# Patient Record
Sex: MALE | Race: WHITE | Employment: OTHER | URBAN - METROPOLITAN AREA
[De-identification: names, ages, dates, MRNs, and addresses within clinical notes are randomized per-mention and may not be internally consistent; named-entity substitution may affect disease eponyms.]

---

## 2017-05-09 ENCOUNTER — HOSPITAL ENCOUNTER (OUTPATIENT)
Age: 77
Setting detail: OUTPATIENT SURGERY
Discharge: HOME | End: 2017-05-09
Attending: INTERNAL MEDICINE | Admitting: INTERNAL MEDICINE
Payer: MEDICARE

## 2017-05-09 ENCOUNTER — ANESTHESIA (OUTPATIENT)
Dept: ENDOSCOPY | Age: 77
End: 2017-05-09
Payer: MEDICARE

## 2017-05-09 ENCOUNTER — ANESTHESIA EVENT (OUTPATIENT)
Dept: ENDOSCOPY | Age: 77
End: 2017-05-09
Payer: MEDICARE

## 2017-05-09 VITALS
TEMPERATURE: 98.2 F | RESPIRATION RATE: 16 BRPM | HEART RATE: 73 BPM | SYSTOLIC BLOOD PRESSURE: 136 MMHG | OXYGEN SATURATION: 100 % | DIASTOLIC BLOOD PRESSURE: 74 MMHG

## 2017-05-09 LAB
H PYLORI FROM TISSUE: NEGATIVE
KIT LOT NO., HCLOLOT: NORMAL
NEGATIVE CONTROL: NEGATIVE
POSITIVE CONTROL: POSITIVE

## 2017-05-09 PROCEDURE — 76060000031 HC ANESTHESIA FIRST 0.5 HR: Performed by: INTERNAL MEDICINE

## 2017-05-09 PROCEDURE — 74011000250 HC RX REV CODE- 250

## 2017-05-09 PROCEDURE — 88312 SPECIAL STAINS GROUP 1: CPT | Performed by: INTERNAL MEDICINE

## 2017-05-09 PROCEDURE — 88305 TISSUE EXAM BY PATHOLOGIST: CPT | Performed by: INTERNAL MEDICINE

## 2017-05-09 PROCEDURE — 88342 IMHCHEM/IMCYTCHM 1ST ANTB: CPT | Performed by: INTERNAL MEDICINE

## 2017-05-09 PROCEDURE — 74011250636 HC RX REV CODE- 250/636: Performed by: INTERNAL MEDICINE

## 2017-05-09 PROCEDURE — 87077 CULTURE AEROBIC IDENTIFY: CPT | Performed by: INTERNAL MEDICINE

## 2017-05-09 PROCEDURE — 76040000019: Performed by: INTERNAL MEDICINE

## 2017-05-09 PROCEDURE — 74011250636 HC RX REV CODE- 250/636

## 2017-05-09 PROCEDURE — 77030018712 HC DEV BLLN INFL BSC -B: Performed by: INTERNAL MEDICINE

## 2017-05-09 PROCEDURE — 77030019988 HC FCPS ENDOSC DISP BSC -B: Performed by: INTERNAL MEDICINE

## 2017-05-09 PROCEDURE — C1726 CATH, BAL DIL, NON-VASCULAR: HCPCS | Performed by: INTERNAL MEDICINE

## 2017-05-09 RX ORDER — DEXTROMETHORPHAN/PSEUDOEPHED 2.5-7.5/.8
1.2 DROPS ORAL
Status: DISCONTINUED | OUTPATIENT
Start: 2017-05-09 | End: 2017-05-09 | Stop reason: HOSPADM

## 2017-05-09 RX ORDER — PANTOPRAZOLE SODIUM 40 MG/1
40 TABLET, DELAYED RELEASE ORAL DAILY
Qty: 30 TAB | Refills: 5 | Status: SHIPPED | OUTPATIENT
Start: 2017-05-09

## 2017-05-09 RX ORDER — ATROPINE SULFATE 0.1 MG/ML
0.5 INJECTION INTRAVENOUS
Status: DISCONTINUED | OUTPATIENT
Start: 2017-05-09 | End: 2017-05-09 | Stop reason: HOSPADM

## 2017-05-09 RX ORDER — EPINEPHRINE 0.1 MG/ML
1 INJECTION INTRACARDIAC; INTRAVENOUS
Status: DISCONTINUED | OUTPATIENT
Start: 2017-05-09 | End: 2017-05-09 | Stop reason: HOSPADM

## 2017-05-09 RX ORDER — SODIUM CHLORIDE 9 MG/ML
100 INJECTION, SOLUTION INTRAVENOUS CONTINUOUS
Status: DISCONTINUED | OUTPATIENT
Start: 2017-05-09 | End: 2017-05-09 | Stop reason: HOSPADM

## 2017-05-09 RX ORDER — PROPOFOL 10 MG/ML
INJECTION, EMULSION INTRAVENOUS AS NEEDED
Status: DISCONTINUED | OUTPATIENT
Start: 2017-05-09 | End: 2017-05-09 | Stop reason: HOSPADM

## 2017-05-09 RX ORDER — SODIUM CHLORIDE 0.9 % (FLUSH) 0.9 %
5-10 SYRINGE (ML) INJECTION EVERY 8 HOURS
Status: DISCONTINUED | OUTPATIENT
Start: 2017-05-09 | End: 2017-05-09 | Stop reason: HOSPADM

## 2017-05-09 RX ORDER — FLUMAZENIL 0.1 MG/ML
0.2 INJECTION INTRAVENOUS
Status: DISCONTINUED | OUTPATIENT
Start: 2017-05-09 | End: 2017-05-09 | Stop reason: HOSPADM

## 2017-05-09 RX ORDER — LIDOCAINE HYDROCHLORIDE 20 MG/ML
INJECTION, SOLUTION EPIDURAL; INFILTRATION; INTRACAUDAL; PERINEURAL AS NEEDED
Status: DISCONTINUED | OUTPATIENT
Start: 2017-05-09 | End: 2017-05-09 | Stop reason: HOSPADM

## 2017-05-09 RX ORDER — NALOXONE HYDROCHLORIDE 0.4 MG/ML
0.4 INJECTION, SOLUTION INTRAMUSCULAR; INTRAVENOUS; SUBCUTANEOUS
Status: DISCONTINUED | OUTPATIENT
Start: 2017-05-09 | End: 2017-05-09 | Stop reason: HOSPADM

## 2017-05-09 RX ORDER — MIDAZOLAM HYDROCHLORIDE 1 MG/ML
1-3 INJECTION, SOLUTION INTRAMUSCULAR; INTRAVENOUS
Status: DISCONTINUED | OUTPATIENT
Start: 2017-05-09 | End: 2017-05-09 | Stop reason: HOSPADM

## 2017-05-09 RX ORDER — SODIUM CHLORIDE 0.9 % (FLUSH) 0.9 %
5-10 SYRINGE (ML) INJECTION AS NEEDED
Status: DISCONTINUED | OUTPATIENT
Start: 2017-05-09 | End: 2017-05-09 | Stop reason: HOSPADM

## 2017-05-09 RX ADMIN — LIDOCAINE HYDROCHLORIDE 100 MG: 20 INJECTION, SOLUTION EPIDURAL; INFILTRATION; INTRACAUDAL; PERINEURAL at 14:25

## 2017-05-09 RX ADMIN — PROPOFOL 180 MG: 10 INJECTION, EMULSION INTRAVENOUS at 14:40

## 2017-05-09 RX ADMIN — SODIUM CHLORIDE 100 ML/HR: 900 INJECTION, SOLUTION INTRAVENOUS at 14:20

## 2017-05-09 NOTE — DISCHARGE INSTRUCTIONS
Jud Eid MD  [facility], [address] [place] [address]  [phone]  [email or web address]    [place]  [id number]  1940    EGD DISCHARGE INSTRUCTIONS    Discomfort:  Sore throat- throat lozenges or warm salt water gargle  redness at IV site- apply warm compress to area; if redness or soreness persist- contact your physician  Gaseous discomfort- walking, belching will help relieve any discomfort  You may not operate a vehicle for 12 hours  You may not engage in an occupation involving machinery or appliances for rest of today  You may not drink alcoholic beverages for at least 12 hours  Avoid making any critical decisions for at least 24 hour  DIET  You may have anything by mouth. You may eat and drink immediately. You may resume your regular diet - however -  remember your colon is empty and a heavy meal will produce gas. Avoid these foods:  vegetables, fried / greasy foods, carbonated drinks      ACTIVITY  You may resume your normal daily activities   Spend the remainder of the day resting -  avoid any strenuous activity. CALL M.D. ANY SIGN OF   Increasing pain, nausea, vomiting  Abdominal distension (swelling)  New increased bleeding (oral or rectal)  Fever (chills)  Pain in chest area  Bloody discharge from nose or mouth  Shortness of breath    Follow-up Instructions:   Call Dr. Jud Eid for any questions or problems. Telephone # [phone]  Dr. Luz Maria Brantley office will notify you of the biopsy results available  Within 7 to 10 days. We will call you or send a letter      ENDOSCOPY FINDINGS:   Your endoscopy showed erosive esophagitis with a stricture that was dilated and erosive gastritis.       DISCHARGE SUMMARY from Nurse    The following personal items collected during your admission are returned to you:   Dental Appliance: Dental Appliances: Partials, Uppers  Vision: Visual Aid: None  Hearing Aid: Jewelry:    Clothing:    Other Valuables:    Valuables sent to safe: MyChart Activation    Thank you for requesting access to Hemarina. Please follow the instructions below to securely access and download your online medical record. Hemarina allows you to send messages to your doctor, view your test results, renew your prescriptions, schedule appointments, and more. How Do I Sign Up? 1. In your internet browser, go to [email or web address]  2. Click on the First Time User? Click Here link in the Sign In box. You will be redirect to the New Member Sign Up page. 3. Enter your Hemarina Access Code exactly as it appears below. You will not need to use this code after youve completed the sign-up process. If you do not sign up before the expiration date, you must request a new code. Hemarina Access Code: [email or web address]  Expires: 2017 10:41 AM (This is the date your Hemarina access code will )    4. Enter the last four digits of your Social Security Number (xxxx) and Date of Birth (mm/dd/yyyy) as indicated and click Submit. You will be taken to the next sign-up page. 5. Create a Hemarina ID. This will be your Hemarina login ID and cannot be changed, so think of one that is secure and easy to remember. 6. Create a Hemarina password. You can change your password at any time. 7. Enter your Password Reset Question and Answer. This can be used at a later time if you forget your password. 8. Enter your e-mail address. You will receive e-mail notification when new information is available in [address] 9. Click Sign Up. You can now view and download portions of your medical record. 10. Click the Download Summary menu link to download a portable copy of your medical information. Additional Information    If you have questions, please visit the Frequently Asked Questions section of the Hemarina website at [email or web address] Remember, Hemarina is NOT to be used for urgent needs.  For medical emergencies, dial 911.

## 2017-05-09 NOTE — ANESTHESIA POSTPROCEDURE EVALUATION
Post-Anesthesia Evaluation and Assessment    Patient: Reno Kaufman MRN: [id number]  SSN: [id number]    YOB: 1940  Age: 65 y.o. Sex: male       Cardiovascular Function/Vital Signs  Visit Vitals    /72    Pulse 64    Temp 36.8 °C (98.2 °F)    Resp 13    SpO2 100%       Patient is status post total IV anesthesia anesthesia for Procedure(s):  ESOPHAGOGASTRODUODENOSCOPY (EGD)  ESOPHAGEAL DILATION  ESOPHAGOGASTRODUODENAL (EGD) BIOPSY. Nausea/Vomiting: None    Postoperative hydration reviewed and adequate. Pain:  Pain Scale 1: Numeric (0 - 10) (05/09/17 1452)  Pain Intensity 1: 0 (05/09/17 1452)   Managed    Neurological Status: At baseline    Mental Status and Level of Consciousness: Arousable    Pulmonary Status:   O2 Device: Room air (05/09/17 1452)   Adequate oxygenation and airway patent    Complications related to anesthesia: None    Post-anesthesia assessment completed.  No concerns    Signed By: Guanakito Lei MD     May 9, 2017

## 2017-05-09 NOTE — IP AVS SNAPSHOT
[address] [facility] 
[phone] Patient: Luz Ramírez MRN: [id number] DJW:[birth year] You are allergic to the following No active allergies Recent Documentation Smoking Status Former Smoker Emergency Contacts Name Discharge Info Relation Home Work Mobile Tracee Curtis  Spouse [3] [phone] Gilmer Curtis  Son [22] [phone] About your hospitalization You were admitted on:  May 9, 2017 You last received care in the:  [facility] ENDOSCOPY You were discharged on:  May 9, 2017 Unit phone number:  [phone] Why you were hospitalized Your primary diagnosis was:  Not on File Providers Seen During Your Hospitalizations Provider Role Specialty Primary office phone Yazmin Lewis MD Attending Provider Gastroenterology [phone] Your Primary Care Physician (PCP) Primary Care Physician Office Phone Office Fax Catherine Ham [phone] Follow-up Information Follow up With Details Comments Contact Info Yashira Dawson MD   [place] [address] 
[phone] Current Discharge Medication List  
  
START taking these medications Dose & Instructions Dispensing Information Comments Morning Noon Evening Bedtime  
 pantoprazole 40 mg tablet Commonly known as:  PROTONIX Your last dose was: Your next dose is:    
   
   
 Dose:  40 mg Take 1 Tab by mouth daily. Indications: EROSIVE ESOPHAGITIS Quantity:  30 Tab Refills:  5 CONTINUE these medications which have NOT CHANGED Dose & Instructions Dispensing Information Comments Morning Noon Evening Bedtime  
 cilostazol 50 mg tablet Commonly known as:  PLETAL Your last dose was: Your next dose is:    
   
   
 Dose:  50 mg Take 1 Tab by mouth Before breakfast and dinner. Quantity:  60 Tab Refills:  11  
     
   
   
   
  
 citalopram 20 mg tablet Commonly known as:  Wilner Duarte Your last dose was: Your next dose is: TAKE 1 TABLET ORALLY DAILY Quantity:  30 Tab Refills:  8  
     
   
   
   
  
 donepezil 10 mg tablet Commonly known as:  ARICEPT Your last dose was: Your next dose is: TAKE 1 TABLET BY MOUTH EVERY DAY Quantity:  30 Tab Refills:  8  
     
   
   
   
  
 memantine 10 mg tablet Commonly known as:  Luisana Divine Your last dose was: Your next dose is:    
   
   
 Dose:  10 mg Take 1 Tab by mouth two (2) times a day. Quantity:  180 Tab Refills:  3  
     
   
   
   
  
 risperiDONE 0.5 mg tablet Commonly known as:  RisperDAL Your last dose was: Your next dose is:    
   
   
 Dose:  0.5 mg Take 1 Tab by mouth two (2) times a day. Quantity:  60 Tab Refills:  11 Where to Get Your Medications Information on where to get these meds will be given to you by the nurse or doctor. ! Ask your nurse or doctor about these medications  
  pantoprazole 40 mg tablet Discharge Instructions Weston Solano MD 
[facility], [address] [place] [address] [address] [place] [address] 
[phone] 
[email or web address] [place] [id number] 
1940 EGD DISCHARGE INSTRUCTIONS Discomfort: 
Sore throat- throat lozenges or warm salt water gargle 
redness at IV site- apply warm compress to area; if redness or soreness persist- contact your physician Gaseous discomfort- walking, belching will help relieve any discomfort You may not operate a vehicle for 12 hours You may not engage in an occupation involving machinery or appliances for rest of today You may not drink alcoholic beverages for at least 12 hours Avoid making any critical decisions for at least 24 hour DIET You may have anything by mouth. You may eat and drink immediately. You may resume your regular diet  however -  remember your colon is empty and a heavy meal will produce gas. Avoid these foods:  vegetables, fried / greasy foods, carbonated drinks ACTIVITY You may resume your normal daily activities Spend the remainder of the day resting -  avoid any strenuous activity. CALL M.D. ANY SIGN OF Increasing pain, nausea, vomiting Abdominal distension (swelling) New increased bleeding (oral or rectal) Fever (chills) Pain in chest area Bloody discharge from nose or mouth Shortness of breath Follow-up Instructions: 
 Call Dr. Dajuan Mendoza for any questions or problems. Telephone # [phone] Dr. Arthur Risk office will notify you of the biopsy results available  Within 7 to 10 days. We will call you or send a letter ENDOSCOPY FINDINGS: 
 Your endoscopy showed erosive esophagitis with a stricture that was dilated and erosive gastritis. DISCHARGE SUMMARY from Nurse The following personal items collected during your admission are returned to you:  
Dental Appliance: Dental Appliances: Partials, Uppers Vision: Visual Aid: None Hearing Aid:   
Jewelry:   
Clothing:   
Other Valuables:   
Valuables sent to safe: MobileHelphart Activation Thank you for requesting access to Stupil. Please follow the instructions below to securely access and download your online medical record. Stupil allows you to send messages to your doctor, view your test results, renew your prescriptions, schedule appointments, and more. How Do I Sign Up? 1. In your internet browser, go to [email or web address] 
2. Click on the First Time User? Click Here link in the Sign In box. You will be redirect to the New Member Sign Up page. 3. Enter your Handprint Access Code exactly as it appears below. You will not need to use this code after youve completed the sign-up process. If you do not sign up before the expiration date, you must request a new code. Handprint Access Code: [email or web address] Expires: 2017 10:41 AM (This is the date your Handprint access code will ) 4. Enter the last four digits of your Social Security Number (xxxx) and Date of Birth (mm/dd/yyyy) as indicated and click Submit. You will be taken to the next sign-up page. 5. Create a Handprint ID. This will be your Handprint login ID and cannot be changed, so think of one that is secure and easy to remember. 6. Create a Handprint password. You can change your password at any time. 7. Enter your Password Reset Question and Answer. This can be used at a later time if you forget your password. 8. Enter your e-mail address. You will receive e-mail notification when new information is available in [address] 9. Click Sign Up. You can now view and download portions of your medical record. 10. Click the Download Summary menu link to download a portable copy of your medical information. Additional Information If you have questions, please visit the Frequently Asked Questions section of the Handprint website at [email or web address] Remember, Handprint is NOT to be used for urgent needs. For medical emergencies, dial 911. Discharge Orders None Introducing [facility]! [employer] introduces Handprint patient portal. Now you can access parts of your medical record, email your doctor's office, and request medication refills online. 1. In your internet browser, go to [email or web address] 2. Click on the First Time User? Click Here link in the Sign In box. You will see the New Member Sign Up page. 3. Enter your Handprint Access Code exactly as it appears below.  You will not need to use this code after youve completed the sign-up process. If you do not sign up before the expiration date, you must request a new code. · Recommind Access Code: [email or web address] Expires: 8/7/2017 10:41 AM 
 
4. Enter the last four digits of your Social Security Number (xxxx) and Date of Birth (mm/dd/yyyy) as indicated and click Submit. You will be taken to the next sign-up page. 5. Create a Recommind ID. This will be your Recommind login ID and cannot be changed, so think of one that is secure and easy to remember. 6. Create a Recommind password. You can change your password at any time. 7. Enter your Password Reset Question and Answer. This can be used at a later time if you forget your password. 8. Enter your e-mail address. You will receive e-mail notification when new information is available in [address] 9. Click Sign Up. You can now view and download portions of your medical record. 10. Click the Download Summary menu link to download a portable copy of your medical information. If you have questions, please visit the Frequently Asked Questions section of the Recommind website. Remember, Recommind is NOT to be used for urgent needs. For medical emergencies, dial 911. Now available from your iPhone and Android! General Information Please provide this summary of care documentation to your next provider. Patient Signature:  ____________________________________________________________ Date:  ____________________________________________________________  
  
Abelardo Greyson Provider Signature:  ____________________________________________________________ Date:  ____________________________________________________________

## 2017-05-09 NOTE — ROUTINE PROCESS
TRANSFER - IN REPORT:    Verbal report received from [facility]) on [address] [facility]  being received from endo(unit) for ordered procedure      Report consisted of patients Situation, Background, Assessment and   Recommendations(SBAR). Information from the following report(s) SBAR was reviewed with the receiving nurse. Opportunity for questions and clarification was provided. Assessment completed upon patients arrival to unit and care assumed.

## 2017-05-09 NOTE — PROGRESS NOTES
Anesthesia reports 180mg Propofol, 100mg Lidocaine and 550mL NS given during procedure. Received report from anesthesia staff on vital signs and status of patient.

## 2017-05-09 NOTE — H&P
Jossy Rodriguez MD  [facility], [address], [address] [facility]  [phone]  [email or web address]      Gastroenterology Outpatient History and Physical    Patient: [place]    Physician: Truman Stinson MD    Vital Signs: Blood pressure 149/76, pulse 63, temperature 98.2 °F (36.8 °C), resp. rate 14, SpO2 97 %. Allergies: No Known Allergies    Chief Complaint: difficulty swallowing    History of Present Illness: Here for EGD and dilation due to increasing solid food dysphagia. Last EGD done 4/2015 when had an esophageal food impaction. Stricture dilated at that time. History:  Past Medical History:   Diagnosis Date    Dementia     Depression 2/15/2013    Neurological disorder     bilateral feet    Snores     2/2014 Per wife, to schedule sleep study    Unspecified sleep apnea     no cpap      Past Surgical History:   Procedure Laterality Date    ENDOSCOPY, COLON, DIAGNOSTIC  2010    Neg. Rep 10 yrs.     HX HEENT  CHILDHOOD    RIGHT EYE SURGERY OPENED LID, BORN WITH LID DROOP    HX HEENT      upper bleth    HX OTHER SURGICAL      right 2nd toe amputation    HX OTHER SURGICAL      surgery on left lateral ulcer foot    [place] BALLOON DILATION ESOPHAGUS <30 MM DIAM  3/26/2014         [place] EGD FLEXIBLE FOREIGN BODY REMOVAL  3/31/2015         [place] E[place] TRANSORAL BIOPSY SINGLE/MULTIPLE  3/26/2014         UPPER GI ENDOSCOPY,BALL DIL,30MM  3/31/2015         UPPER GI ENDOSCOPY,BIOPSY  3/31/2015           Social History     Social History    Marital status:      Spouse name: N/A    Number of children: N/A    Years of education: N/A     Social History Main Topics    Smoking status: Former Smoker     Packs/day: 1.00     Years: 12.00     Quit date: 1/1/1984    Smokeless tobacco: Never Used    Alcohol use 2.4 oz/week     4 Cans of beer per week    Drug use: No    Sexual activity: Not Asked     Other Topics Concern    None     Social History Narrative    ** Merged History Encounter **           Family History   Problem Relation Age of Onset    Psychiatric Disorder Father      suicie    Other Mother       Patient Active Problem List   Diagnosis Code    Dysesthesia R20.8    Peripheral neuropathy ([address] G62.9    Pressure ulcer L89.90    Fracture of great toe S92.403A    Elevated BP [id number]    Dementia F03.90    Shortness of breath R06.02    Depression F32.9    Toe osteomyelitis, right (HCC) M86.9    Cellulitis and abscess of foot, except toes L03.119, L02.619    Mild cognitive impairment G31.84    Essential hypertension I10         Medications:   Prior to Admission medications    Medication Sig Start Date End Date Taking? Authorizing Provider   risperiDONE (RISPERDAL) 0.5 mg tablet Take 1 Tab by mouth two (2) times a day. 5/9/17  Yes Hardeep Rodney MD   cilostazol (PLETAL) 50 mg tablet Take 1 Tab by mouth Before breakfast and dinner. 5/9/17  Yes Hardeep Rodney MD   donepezil (ARICEPT) 10 mg tablet TAKE 1 TABLET BY MOUTH EVERY DAY 4/27/17  Yes MORA Rodney MD   citalopram (CELEXA) 20 mg tablet TAKE 1 TABLET ORALLY DAILY 4/27/17  Yes MORA Rodney MD   memantine [facility]) 10 mg tablet Take 1 Tab by mouth two (2) times a day.  2/8/17  Yes Hardeep Rodney MD       Physical Exam:     General: well developed, well nourished   HEENT: unremarkable   Heart: regular rhythm no mumur    Lungs: clear   Abdominal:  benign   Neurological: unremarkable   Extremities: no edema     Findings/Diagnosis: esophageal dysphagia    Plan of Care/Planned Procedure: EGD and dilation with  monitored anesthesia care sedation       Signed:  Sin Ludwig MD 5/9/2017

## 2017-05-09 NOTE — PROCEDURES
[facility]                   Endoscopic Gastroduodenoscopy Procedure Note      5/9/2017  Darian Curtis  1940  [id number]    Procedure: Endoscopic Gastroduodenoscopy with biopsy, esophageal dilation    Indication: esophageal dysphagia     Pre-operative Diagnosis: see indication above    Post-operative Diagnosis: see findings below    : P. Delano Holter. MD    Referring Provider:  Harris Monson MD      Anesthesia/Sedation:  MAC anesthesia Propofol    Airway assessment: No airway problems anticipated    Pre-Procedural Exam:      Airway: clear, no airway problems anticipated  Heart: RRR, without gallops or rubs  Lungs: clear bilaterally without wheezes, crackles, or rhonchi  Abdomen: soft, nontender, nondistended, bowel sounds present  Mental Status: awake, alert and oriented to person, place and time       Procedure Details     After infomed consent was obtained for the procedure, with all risks and benefits of procedure explained the patient was taken to the endoscopy suite and placed in the left lateral decubitus position. Following sequential administration of sedation as per above, the endoscope was inserted into the mouth and advanced under direct vision to second portion of the duodenum. A careful inspection was made as the gastroscope was withdrawn, including a retroflexed view of the proximal stomach; findings and interventions are described below. Findings:   Esophagus:  Erosive esophagitis at the GE junction with stricture, estimated diameter 9 mm. Scope does not pass until dilated. Dilated with balloon from 15 to 16.5 then 18mm. Stomach: Erosive gastritis of cardia, biopsied. Erosive gastritis localized in pre-pylori antrum, biopsied  Duodenum: normal    Therapies:  1. Biopsies of pre-pylori antrum  2. Biopsies of gastric cardia  3. Biopsies of ge junction    Specimens: 1. Biopsies of pre-pylori antrum  2.  Biopsies of gastric cardia  3. Biopsies of ge junction           Complications:   None; patient tolerated the procedure well. EBL:  None. Impression:      -Erosive esophagitis with stricture  -Erosive gastritis    Recommendations:  -Start acid suppression with pantoprazole 40 mg daily. , -Await pathology. , -Await pyloritek test result and treat for Helicobacter pylori if positive. , -Follow symptoms.     Max Enriquez., MD5/9/2017

## 2017-05-09 NOTE — ANESTHESIA PREPROCEDURE EVALUATION
Anesthetic History   No history of anesthetic complications            Review of Systems / Medical History  Patient summary reviewed, nursing notes reviewed and pertinent labs reviewed    Pulmonary        Sleep apnea: No treatment  Shortness of breath      Comments: Former Smoker - 12 pack years   Neuro/Psych         Psychiatric history and dementia    Comments: Depression   Dysesthesia  Peripheral neuropathy  Mild cognitive impairment Cardiovascular    Hypertension: well controlled              Exercise tolerance: >4 METS     GI/Hepatic/Renal               Comments: dysphagia Endo/Other        Obesity     Other Findings   Comments: Hx osteomyelitis  Dysphagia          Physical Exam    Airway  Mallampati: II    Neck ROM: normal range of motion   Mouth opening: Normal     Cardiovascular  Regular rate and rhythm,  S1 and S2 normal,  no murmur, click, rub, or gallop             Dental    Dentition: Upper partial plate     Pulmonary  Breath sounds clear to auscultation               Abdominal  GI exam deferred       Other Findings            Anesthetic Plan    ASA: 3  Anesthesia type: general and total IV anesthesia          Induction: Intravenous  Anesthetic plan and risks discussed with: Patient

## 2017-05-09 NOTE — PROGRESS NOTES
CRE balloon dilatation of the esophagus   15 mm Balloon inflated to 3 ATMs and held for 30 seconds. 16.5 mm Balloon inflated to 4.5 ATMs and held for 30 seconds. 18 mm Balloon inflated to 7 ATMs and held for 60 seconds. No subcutaneous crepitus of the chest or cervical region was noted post dilatation.

## 2017-11-27 ENCOUNTER — HOSPITAL ENCOUNTER (OUTPATIENT)
Dept: GENERAL RADIOLOGY | Age: 77
Discharge: HOME | End: 2017-11-27
Attending: INTERNAL MEDICINE
Payer: MEDICARE

## 2017-11-27 DIAGNOSIS — R63.4 WEIGHT LOSS: ICD-10-CM

## 2017-11-27 PROCEDURE — 71020 XR CHEST PA LAT: CPT

## 2017-12-04 ENCOUNTER — APPOINTMENT (OUTPATIENT)
Dept: CT IMAGING | Age: 77
End: 2017-12-04
Attending: EMERGENCY MEDICINE
Payer: MEDICARE

## 2017-12-04 ENCOUNTER — HOSPITAL ENCOUNTER (EMERGENCY)
Age: 77
Discharge: NURSING, REHAB OR LONG-TERM CARE | End: 2017-12-05
Attending: EMERGENCY MEDICINE
Payer: MEDICARE

## 2017-12-04 ENCOUNTER — APPOINTMENT (OUTPATIENT)
Dept: GENERAL RADIOLOGY | Age: 77
End: 2017-12-04
Attending: EMERGENCY MEDICINE
Payer: MEDICARE
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
## 2017-12-04 DIAGNOSIS — S01.112A EYEBROW LACERATION, LEFT, INITIAL ENCOUNTER: ICD-10-CM

## 2017-12-04 DIAGNOSIS — S60.221A CONTUSION OF RIGHT HAND, INITIAL ENCOUNTER: ICD-10-CM

## 2017-12-04 PROCEDURE — 93005 ELECTROCARDIOGRAM TRACING: CPT

## 2017-12-04 PROCEDURE — 70450 CT HEAD/BRAIN W/O DYE: CPT

## 2017-12-04 PROCEDURE — 99285 EMERGENCY DEPT VISIT HI MDM: CPT

## 2017-12-04 PROCEDURE — 72125 CT NECK SPINE W/O DYE: CPT

## 2017-12-04 PROCEDURE — 71020 XR CHEST PA LAT: CPT

## 2017-12-04 PROCEDURE — 75810000293 HC SIMP/SUPERF WND  RPR

## 2017-12-04 PROCEDURE — 73130 X-RAY EXAM OF HAND: CPT

## 2017-12-04 RX ORDER — SODIUM CHLORIDE 0.9 % (FLUSH) 0.9 %
5-10 SYRINGE (ML) INJECTION EVERY 8 HOURS
Status: DISCONTINUED | OUTPATIENT
Start: 2017-12-04 | End: 2017-12-05 | Stop reason: HOSPADM

## 2017-12-04 RX ORDER — SODIUM CHLORIDE 0.9 % (FLUSH) 0.9 %
5-10 SYRINGE (ML) INJECTION AS NEEDED
Status: DISCONTINUED | OUTPATIENT
Start: 2017-12-04 | End: 2017-12-05 | Stop reason: HOSPADM

## 2017-12-05 VITALS
OXYGEN SATURATION: 94 % | RESPIRATION RATE: 16 BRPM | DIASTOLIC BLOOD PRESSURE: 75 MMHG | TEMPERATURE: 98 F | HEART RATE: 70 BPM | SYSTOLIC BLOOD PRESSURE: 163 MMHG | BODY MASS INDEX: 25.6 KG/M2 | WEIGHT: 194 LBS

## 2017-12-05 LAB
ALBUMIN SERPL-MCNC: 3.3 G/DL (ref 3.5–5)
ALBUMIN/GLOB SERPL: 1.1 {RATIO} (ref 1.1–2.2)
ALP SERPL-CCNC: 57 U/L (ref 45–117)
ALT SERPL-CCNC: 19 U/L (ref 12–78)
ANION GAP SERPL CALC-SCNC: 7 MMOL/L (ref 5–15)
APPEARANCE UR: ABNORMAL
AST SERPL-CCNC: 24 U/L (ref 15–37)
ATRIAL RATE: 69 BPM
BACTERIA URNS QL MICRO: NEGATIVE /HPF
BASOPHILS # BLD: 0 K/UL (ref 0–0.1)
BASOPHILS NFR BLD: 0 % (ref 0–1)
BILIRUB SERPL-MCNC: 0.5 MG/DL (ref 0.2–1)
BILIRUB UR QL CFM: NEGATIVE
BUN SERPL-MCNC: 14 MG/DL (ref 6–20)
BUN/CREAT SERPL: 14 (ref 12–20)
CALCIUM SERPL-MCNC: 8.4 MG/DL (ref 8.5–10.1)
CALCULATED P AXIS, ECG09: 63 DEGREES
CALCULATED R AXIS, ECG10: -43 DEGREES
CALCULATED T AXIS, ECG11: 44 DEGREES
CHLORIDE SERPL-SCNC: 107 MMOL/L (ref 97–108)
CK MB CFR SERPL CALC: 2.4 % (ref 0–2.5)
CK MB SERPL-MCNC: 2.9 NG/ML (ref 5–25)
CK SERPL-CCNC: 120 U/L (ref 39–308)
CO2 SERPL-SCNC: 26 MMOL/L (ref 21–32)
COLOR UR: ABNORMAL
CREAT SERPL-MCNC: 1.01 MG/DL (ref 0.7–1.3)
DIAGNOSIS, 93000: NORMAL
EOSINOPHIL # BLD: 0.1 K/UL (ref 0–0.4)
EOSINOPHIL NFR BLD: 1 % (ref 0–7)
EPITH CASTS URNS QL MICRO: ABNORMAL /LPF
ERYTHROCYTE [DISTWIDTH] IN BLOOD BY AUTOMATED COUNT: 13.2 % (ref 11.5–14.5)
GLOBULIN SER CALC-MCNC: 3.1 G/DL (ref 2–4)
GLUCOSE SERPL-MCNC: 87 MG/DL (ref 65–100)
GLUCOSE UR STRIP.AUTO-MCNC: NEGATIVE MG/DL
HCT VFR BLD AUTO: 38.5 % (ref 36.6–50.3)
HGB BLD-MCNC: 13.3 G/DL (ref 12.1–17)
HGB UR QL STRIP: ABNORMAL
HYALINE CASTS URNS QL MICRO: ABNORMAL /LPF (ref 0–5)
KETONES UR QL STRIP.AUTO: NEGATIVE MG/DL
LEUKOCYTE ESTERASE UR QL STRIP.AUTO: ABNORMAL
LYMPHOCYTES # BLD: 2.1 K/UL (ref 0.8–3.5)
LYMPHOCYTES NFR BLD: 30 % (ref 12–49)
MCH RBC QN AUTO: 29.8 PG (ref 26–34)
MCHC RBC AUTO-ENTMCNC: 34.5 G/DL (ref 30–36.5)
MCV RBC AUTO: 86.3 FL (ref 80–99)
MONOCYTES # BLD: 0.9 K/UL (ref 0–1)
MONOCYTES NFR BLD: 13 % (ref 5–13)
NEUTS SEG # BLD: 3.9 K/UL (ref 1.8–8)
NEUTS SEG NFR BLD: 56 % (ref 32–75)
NITRITE UR QL STRIP.AUTO: NEGATIVE
P-R INTERVAL, ECG05: 184 MS
PH UR STRIP: 6 [PH] (ref 5–8)
PLATELET # BLD AUTO: 192 K/UL (ref 150–400)
POTASSIUM SERPL-SCNC: 3.9 MMOL/L (ref 3.5–5.1)
PROT SERPL-MCNC: 6.4 G/DL (ref 6.4–8.2)
PROT UR STRIP-MCNC: ABNORMAL MG/DL
Q-T INTERVAL, ECG07: 416 MS
QRS DURATION, ECG06: 80 MS
QTC CALCULATION (BEZET), ECG08: 445 MS
RBC # BLD AUTO: 4.46 M/UL (ref 4.1–5.7)
RBC #/AREA URNS HPF: ABNORMAL /HPF (ref 0–5)
SODIUM SERPL-SCNC: 140 MMOL/L (ref 136–145)
SP GR UR REFRACTOMETRY: 1.02 (ref 1–1.03)
TROPONIN I SERPL-MCNC: <0.04 NG/ML
UA: UC IF INDICATED,UAUC: ABNORMAL
UROBILINOGEN UR QL STRIP.AUTO: 1 EU/DL (ref 0.2–1)
VENTRICULAR RATE, ECG03: 69 BPM
WBC # BLD AUTO: 7 K/UL (ref 4.1–11.1)
WBC URNS QL MICRO: ABNORMAL /HPF (ref 0–4)

## 2017-12-05 PROCEDURE — 82550 ASSAY OF CK (CPK): CPT | Performed by: EMERGENCY MEDICINE

## 2017-12-05 PROCEDURE — 85025 COMPLETE CBC W/AUTO DIFF WBC: CPT | Performed by: EMERGENCY MEDICINE

## 2017-12-05 PROCEDURE — 36415 COLL VENOUS BLD VENIPUNCTURE: CPT | Performed by: EMERGENCY MEDICINE

## 2017-12-05 PROCEDURE — 80053 COMPREHEN METABOLIC PANEL: CPT | Performed by: EMERGENCY MEDICINE

## 2017-12-05 PROCEDURE — 77030011943

## 2017-12-05 PROCEDURE — 77030018836 HC SOL IRR NACL ICUM -A

## 2017-12-05 PROCEDURE — 84484 ASSAY OF TROPONIN QUANT: CPT | Performed by: EMERGENCY MEDICINE

## 2017-12-05 PROCEDURE — 75810000293 HC SIMP/SUPERF WND  RPR

## 2017-12-05 PROCEDURE — 77030002916 HC SUT ETHLN J&J -A

## 2017-12-05 PROCEDURE — 81001 URINALYSIS AUTO W/SCOPE: CPT | Performed by: EMERGENCY MEDICINE

## 2017-12-05 RX ORDER — LIDOCAINE HYDROCHLORIDE 10 MG/ML
5 INJECTION, SOLUTION EPIDURAL; INFILTRATION; INTRACAUDAL; PERINEURAL ONCE
Status: DISCONTINUED | OUTPATIENT
Start: 2017-12-05 | End: 2017-12-05 | Stop reason: HOSPADM

## 2017-12-05 NOTE — DISCHARGE INSTRUCTIONS
Hand Bruises: Care Instructions  Your Care Instructions  Bruises, or contusions, can happen as a result of an impact or fall. Most people think of a bruise as a black-and-blue spot. This happens when small blood vessels get torn and leak blood under the skin. The bruise may turn purplish black, reddish blue, or yellowish green as it heals. But bones and muscles can also get bruised. This may damage the hand but not cause a bruise that you can see. Most bruises aren't serious and will go away on their own in 2 to 4 weeks. But sometimes a more serious hand injury might not heal on its own. Tell your doctor if you have new symptoms or your injury is not getting better over time. You may have tests to see if you have bone or nerve damage. These tests may include X-rays, a CT scan, or an MRI. If you damaged bones or muscles, you may need more treatment. The doctor has checked you carefully, but problems can develop later. If you notice any problems or new symptoms, get medical treatment right away. Follow-up care is a key part of your treatment and safety. Be sure to make and go to all appointments, and call your doctor if you are having problems. It's also a good idea to know your test results and keep a list of the medicines you take. How can you care for yourself at home? · Put ice or a cold pack on the hand for 10 to 20 minutes at a time. Put a thin cloth between the ice and your skin. · Prop up your hand on a pillow when you ice it or anytime you sit or lie down during the next 3 days. Try to keep your hand above the level of your heart. This will help reduce swelling. · Be safe with medicines. Read and follow all instructions on the label. ¨ If the doctor gave you a prescription medicine for pain, take it as prescribed. ¨ If you are not taking a prescription pain medicine, ask your doctor if you can take an over-the-counter medicine.   · Be sure to follow your doctor's advice about moving and exercising your injured hand. When should you call for help? Call your doctor now or seek immediate medical care if:  ? · Your pain gets worse. ? · You have new or worse swelling. ? · You have tingling, weakness, or numbness in the area near the bruise. ? · The area near the bruise is cold or pale. ? · You have symptoms of infection, such as:  ¨ Increased pain, swelling, warmth, or redness. ¨ Red streaks leading from the area. ¨ Pus draining from the area. ¨ A fever. ? Watch closely for changes in your health, and be sure to contact your doctor if:  ? · You do not get better as expected. Where can you learn more? Go to http://manuel-reynaldo.info/. Enter K962 in the search box to learn more about \"Hand Bruises: Care Instructions. \"  Current as of: March 20, 2017  Content Version: 11.4  © 9580-3112 [employer]. Care instructions adapted under license by [employer] (which disclaims liability or warranty for this information). If you have questions about a medical condition or this instruction, always ask your healthcare professional. [place] [address] any warranty or liability for your use of this information. Preventing Falls: Care Instructions  Your Care Instructions    Getting around your home safely can be a challenge if you have injuries or health problems that make it easy for you to fall. Loose rugs and furniture in walkways are among the dangers for many older people who have problems walking or who have poor eyesight. People who have conditions such as arthritis, osteoporosis, or dementia also have to be careful not to fall. You can make your home safer with a few simple measures. Follow-up care is a key part of your treatment and safety. Be sure to make and go to all appointments, and call your doctor if you are having problems. It's also a good idea to know your test results and keep a list of the medicines you take.   How can you care for yourself at home? Taking care of yourself  · You may get dizzy if you do not drink enough water. To prevent dehydration, drink plenty of fluids, enough so that your urine is light yellow or clear like water. Choose water and other caffeine-free clear liquids. If you have kidney, heart, or liver disease and have to limit fluids, talk with your doctor before you increase the amount of fluids you drink. · Exercise regularly to improve your strength, muscle tone, and balance. Walk if you can. Swimming may be a good choice if you cannot walk easily. · Have your vision and hearing checked each year or any time you notice a change. If you have trouble seeing and hearing, you might not be able to avoid objects and could lose your balance. · Know the side effects of the medicines you take. Ask your doctor or pharmacist whether the medicines you take can affect your balance. Sleeping pills or sedatives can affect your balance. · Limit the amount of alcohol you drink. Alcohol can impair your balance and other senses. · Ask your doctor whether calluses or corns on your feet need to be removed. If you wear loose-fitting shoes because of calluses or corns, you can lose your balance and fall. · Talk to your doctor if you have numbness in your feet. Preventing falls at home  · Remove raised doorway thresholds, throw rugs, and clutter. Repair loose carpet or raised areas in the floor. · Move furniture and electrical cords to keep them out of walking paths. · Use nonskid floor wax, and wipe up spills right away, especially on ceramic tile floors. · If you use a walker or cane, put rubber tips on it. If you use crutches, clean the bottoms of them regularly with an abrasive pad, such as steel wool. · Keep your house well lit, especially [facility], and outside walkways. Use night-lights in areas such as hallways and bathrooms.  Add extra light switches or use remote switches (such as switches that go on or off when you clap your hands) to make it easier to turn lights on if you have to get up during the night. · Install sturdy handrails on stairways. · Move items in your cabinets so that the things you use a lot are on the lower shelves (about waist level). · Keep a cordless phone and a flashlight with new batteries by your bed. If possible, put a phone in each of the main rooms of your house, or carry a cell phone in case you fall and cannot reach a phone. Or, you can wear a device around your neck or wrist. You push a button that sends a signal for help. · Wear low-heeled shoes that fit well and give your feet good support. Use footwear with nonskid soles. Check the heels and soles of your shoes for wear. Repair or replace worn heels or soles. · Do not wear socks without shoes on wood floors. · Walk on the grass when the sidewalks are slippery. If you live in an area that gets snow and ice in the winter, sprinkle salt on slippery steps and sidewalks. Preventing falls in the bath  · Install grab bars and nonskid mats inside and outside your shower or tub and near the toilet and sinks. · Use shower chairs and bath benches. · Use a hand-held shower head that will allow you to sit while showering. · Get into a tub or shower by putting the weaker leg in first. Get out of a tub or shower with your strong side first.  · Repair loose toilet seats and consider installing a raised toilet seat to make getting on and off the toilet easier. · Keep your bathroom door unlocked while you are in the shower. Where can you learn more? Go to http://manuel-reynaldo.info/. Enter [id number] in the search box to learn more about \"Preventing Falls: Care Instructions. \"  Current as of: May 12, 2017  Content Version: 11.4  © 8600-9835 [employer]. Care instructions adapted under license by [employer] (which disclaims liability or warranty for this information).  If you have questions about a medical condition or this instruction, always ask your healthcare professional. [address] any warranty or liability for your use of this information. Cuts: Care Instructions  Your Care Instructions  A cut can happen anywhere on your body. Stitches, staples, skin adhesives, or pieces of tape called Steri-Strips are sometimes used to keep the edges of a cut together and help it heal. Steri-Strips can be used by themselves or with stitches or staples. Sometimes cuts are left open. If the cut went deep and through the skin, the doctor may have closed the cut in two layers. A deeper layer of stitches brings the deep part of the cut together. These stitches will dissolve and don't need to be removed. The upper layer closure, which could be stitches, staples, Steri-Strips, or adhesive, is what you see on the cut. A cut is often covered by a bandage. The doctor has checked you carefully, but problems can develop later. If you notice any problems or new symptoms, get medical treatment right away. Follow-up care is a key part of your treatment and safety. Be sure to make and go to all appointments, and call your doctor if you are having problems. It's also a good idea to know your test results and keep a list of the medicines you take. How can you care for yourself at home? If a cut is open or closed  · Prop up the sore area on a pillow anytime you sit or lie down during the next 3 days. Try to keep it above the level of your heart. This will help reduce swelling. · Keep the cut dry for the first 24 to 48 hours. After this, you can shower if your doctor okays it. Pat the cut dry. · Don't soak the cut, such as in a bathtub. Your doctor will tell you when it's safe to get the cut wet. · After the first 24 to 48 hours, clean the cut with soap and water 2 times a day unless your doctor gives you different instructions.   ¨ Don't use hydrogen peroxide or alcohol, which can slow healing. ¨ You may cover the cut with a thin layer of petroleum jelly and a nonstick bandage. ¨ If the doctor put a bandage over the cut, put on a new bandage after cleaning the cut or if the bandage gets wet or dirty. · Avoid any activity that could cause your cut to reopen. · Be safe with medicines. Read and follow all instructions on the label. ¨ If the doctor gave you a prescription medicine for pain, take it as prescribed. ¨ If you are not taking a prescription pain medicine, ask your doctor if you can take an over-the-counter medicine. If the cut is closed with stitches, staples, or Steri-Strips  · Follow the above instructions for open or closed cuts. · Do not remove the stitches or staples on your own. Your doctor will tell you when to come back to have the stitches or staples removed. · Leave Steri-Strips on until they fall off. If the cut is closed with a skin adhesive  · Follow the above instructions for open or closed cuts. · Leave the skin adhesive on your skin until it falls off on its own. This may take 5 to 10 days. · Do not scratch, rub, or pick at the adhesive. · Do not put the sticky part of a bandage directly on the adhesive. · Do not put any kind of ointment, cream, or lotion over the area. This can make the adhesive fall off too soon. Do not use hydrogen peroxide or alcohol, which can slow healing. When should you call for help? Call your doctor now or seek immediate medical care if:  ? · You have new pain, or your pain gets worse. ? · The skin near the cut is cold or pale or changes color. ? · You have tingling, weakness, or numbness near the cut.   ? · The cut starts to bleed, and blood soaks through the bandage. Oozing small amounts of blood is normal.   ? · You have trouble moving the area near the cut.   ? · You have symptoms of infection, such as:  ¨ Increased pain, swelling, warmth, or redness around the cut. ¨ Red streaks leading from the cut.   ¨ Pus draining from the cut. ¨ A fever. ? Watch closely for changes in your health, and be sure to contact your doctor if:  ? · The cut reopens. ? · You do not get better as expected. Where can you learn more? Go to http://manuel-reynaldo.info/. Enter M735 in the search box to learn more about \"Cuts: Care Instructions. \"  Current as of: March 20, 2017  Content Version: 11.4  © 3091-0824 [employer]. Care instructions adapted under license by [employer] (which disclaims liability or warranty for this information). If you have questions about a medical condition or this instruction, always ask your healthcare professional. [address] any warranty or liability for your use of this information.

## 2017-12-05 NOTE — ED PROVIDER NOTES
EMERGENCY DEPARTMENT HISTORY AND PHYSICAL EXAM      Date: 12/4/2017  Patient Name: Janis Burgos    History of Presenting Illness     Chief Complaint   Patient presents with   [employer] Fall     pt arrives by EMS from [address] [facility] with reports of a fall earlier this evening, abrasion above left eye and right forearm, pt is confused at baseline, per EMS pt was admitted to nursing home today and staff was concerned that he may be more confused, pt denies pain, has swelling to righ hand        History Provided By: Patient and EMS    HPI: Janis Burgos is a 65 y.o. male, pmhx dementia, who presents via EMS as transferred from [facility] and [address] [facility] to the ED for evaluation s/p GLF. Pt arrives with a laceration to his L eyebrow. He currently c/o R hand pain s/p fall. Pt denies any recent medications for his current sxs. Per EMS , pt was admitted to [facility] earlier today as his family can no longer care for him. EMS notes pt was found at ~2200 this evening lying, on his side, on the floor in his room. SNF staff states the pt was last seen ~30 minutes prior to being found on the floor, and note he appears more confused than he was this morning. Pt otherwise specifically denies any recent fevers, chills, nausea, vomiting, diarrhea, abd pain, CP, SOB, urinary sxs, changes in BM, or headache. PCP: Mela Hunter MD    Allergies: NKDA  PMHx: Significant for depression, dementia  PSHx: Significant for EGD  Social Hx: -tobacco (former 5844), +EtOH, -Illicit Drugs    Hx limited secondary to pt's known hx of dementia.      Current Facility-Administered Medications   Medication Dose Route Frequency Provider Last Rate Last Dose    lidocaine (PF) (XYLOCAINE) 10 mg/mL (1 %) injection 5 mL  5 mL SubCUTAneous ONCE April Ranulfo Bacon MD        neomycin-bacitracnZn-polymyxnB (NEOSPORIN) ointment 1 Packet  1 Packet Topical NOW April ORACIO Dey MD        sodium chloride (NS) flush 5-10 mL  5-10 mL IntraVENous Q8H Lucy Dey MD        sodium chloride (NS) flush 5-10 mL  5-10 mL IntraVENous PRN Lucy Dey MD         Current Outpatient Prescriptions   Medication Sig Dispense Refill    aspirin delayed-release 81 mg tablet Take 81 mg by mouth daily.  risperiDONE (RISPERDAL) 0.5 mg tablet Take 1 Tab by mouth two (2) times a day. 60 Tab 11    cilostazol (PLETAL) 50 mg tablet Take 1 Tab by mouth Before breakfast and dinner. 60 Tab 11    pantoprazole (PROTONIX) 40 mg tablet Take 1 Tab by mouth daily. Indications: EROSIVE ESOPHAGITIS 30 Tab 5    donepezil (ARICEPT) 10 mg tablet TAKE 1 TABLET BY MOUTH EVERY DAY 30 Tab 8    citalopram (CELEXA) 20 mg tablet TAKE 1 TABLET ORALLY DAILY 30 Tab 8    memantine (NAMENDA) 10 mg tablet Take 1 Tab by mouth two (2) times a day. 180 Tab 3       Past History     Past Medical History:  Past Medical History:   Diagnosis Date    Dementia     Depression 2/15/2013    Neurological disorder     bilateral feet    Snores     2/2014 Per wife, to schedule sleep study    Unspecified sleep apnea     no cpap       Past Surgical History:  Past Surgical History:   Procedure Laterality Date    ENDOSCOPY, COLON, DIAGNOSTIC  2010    Neg. Rep 10 yrs.     HX HEENT  CHILDHOOD    RIGHT EYE SURGERY OPENED LID, BORN WITH LID DROOP    HX HEENT      upper bleth    HX OTHER SURGICAL      right 2nd toe amputation    HX OTHER SURGICAL      surgery on left lateral ulcer foot    FL EGD BALLOON DILATION ESOPHAGUS <30 MM DIAM  3/26/2014         FL EGD FLEXIBLE FOREIGN BODY REMOVAL  3/31/2015         FL EGD TRANSORAL BIOPSY SINGLE/MULTIPLE  3/26/2014         UPPER GI ENDOSCOPY,BALL DIL,30MM  3/31/2015         UPPER GI ENDOSCOPY,BALL DIL,30MM  5/9/2017         UPPER GI ENDOSCOPY,BIOPSY  3/31/2015         UPPER GI ENDOSCOPY,BIOPSY  5/9/2017            Family History:  Family History   Problem Relation Age of Onset    Psychiatric Disorder Father      suicie    Other Mother        Social History:  Social History   Substance Use Topics    Smoking status: Former Smoker     Packs/day: 1.00     Years: 12.00     Quit date: 1/1/1984    Smokeless tobacco: Never Used    Alcohol use 2.4 oz/week     4 Cans of beer per week       Allergies:  No Known Allergies      Review of Systems   Review of Systems   Unable to perform ROS: Dementia       Physical Exam   Physical Exam   Nursing note and vitals reviewed.     General appearance - well nourished, well appearing, and in no distress  Eyes - pupils equal and reactive, extraocular eye movements intact  ENT - mucous membranes moist, pharynx normal without lesions  Neck - supple, no significant adenopathy; non-tender to palpation  Chest - clear to auscultation, no wheezes, rales or rhonchi; non-tender to palpation  Heart - normal rate and regular rhythm, S1 and S2 normal, no murmurs noted  Abdomen - soft, nontender, nondistended, no masses or organomegaly  Musculoskeletal - no joint deformity; normal ROM,  tenderness and swelling over his R hand  Extremities - peripheral pulses normal, no pedal edema  Skin - normal coloration and turgor, no rashes, laceration over his L eye that is steri-stripped, ecchymosis over L eye  Neurological - alert and oriented only to person, normal speech, no focal findings or movement disorder noted  Written by JUDITH Duranibpiyush, as dictated by Jase Farooq MD      Diagnostic Study Results     Labs -     Recent Results (from the past 12 hour(s))   EKG, 12 LEAD, INITIAL    Collection Time: 12/04/17 11:19 PM   Result Value Ref Range    Ventricular Rate 69 BPM    Atrial Rate 69 BPM    P-R Interval 184 ms    QRS Duration 80 ms    Q-T Interval 416 ms    QTC Calculation (Bezet) 445 ms    Calculated P Axis 63 degrees    Calculated R Axis -43 degrees    Calculated T Axis 44 degrees    Diagnosis       Normal sinus rhythm  Left axis deviation  When compared with ECG of 12-FEB-2014 09:18,  No significant change was found     CBC WITH AUTOMATED DIFF    Collection Time: 12/05/17 12:32 AM   Result Value Ref Range    WBC 7.0 4.1 - 11.1 K/uL    RBC 4.46 4.10 - 5.70 M/uL    HGB 13.3 12.1 - 17.0 g/dL    HCT 38.5 36.6 - 50.3 %    MCV 86.3 80.0 - 99.0 FL    MCH 29.8 26.0 - 34.0 PG    MCHC 34.5 30.0 - 36.5 g/dL    RDW 13.2 11.5 - 14.5 %    PLATELET 139 616 - 748 K/uL    NEUTROPHILS 56 32 - 75 %    LYMPHOCYTES 30 12 - 49 %    MONOCYTES 13 5 - 13 %    EOSINOPHILS 1 0 - 7 %    BASOPHILS 0 0 - 1 %    ABS. NEUTROPHILS 3.9 1.8 - 8.0 K/UL    ABS. LYMPHOCYTES 2.1 0.8 - 3.5 K/UL    ABS. MONOCYTES 0.9 0.0 - 1.0 K/UL    ABS. EOSINOPHILS 0.1 0.0 - 0.4 K/UL    ABS. BASOPHILS 0.0 0.0 - 0.1 K/UL   METABOLIC PANEL, COMPREHENSIVE    Collection Time: 12/05/17 12:32 AM   Result Value Ref Range    Sodium 140 136 - 145 mmol/L    Potassium 3.9 3.5 - 5.1 mmol/L    Chloride 107 97 - 108 mmol/L    CO2 26 21 - 32 mmol/L    Anion gap 7 5 - 15 mmol/L    Glucose 87 65 - 100 mg/dL    BUN 14 6 - 20 MG/DL    Creatinine 1.01 0.70 - 1.30 MG/DL    BUN/Creatinine ratio 14 12 - 20      GFR est AA >60 >60 ml/min/1.73m2    GFR est non-AA >60 >60 ml/min/1.73m2    Calcium 8.4 (L) 8.5 - 10.1 MG/DL    Bilirubin, total 0.5 0.2 - 1.0 MG/DL    ALT (SGPT) 19 12 - 78 U/L    AST (SGOT) 24 15 - 37 U/L    Alk.  phosphatase 57 45 - 117 U/L    Protein, total 6.4 6.4 - 8.2 g/dL    Albumin 3.3 (L) 3.5 - 5.0 g/dL    Globulin 3.1 2.0 - 4.0 g/dL    A-G Ratio 1.1 1.1 - 2.2     CK W/ CKMB & INDEX    Collection Time: 12/05/17 12:32 AM   Result Value Ref Range     39 - 308 U/L    CK - MB 2.9 <3.6 NG/ML    CK-MB Index 2.4 0 - 2.5     TROPONIN I    Collection Time: 12/05/17 12:32 AM   Result Value Ref Range    Troponin-I, Qt. <0.04 <0.05 ng/mL   URINALYSIS W/ REFLEX CULTURE    Collection Time: 12/05/17  1:52 AM   Result Value Ref Range    Color DARK YELLOW      Appearance CLOUDY (A) CLEAR      Specific gravity 1.025 1.003 - 1.030      pH (UA) 6.0 5.0 - 8.0      Protein TRACE (A) NEG mg/dL    Glucose NEGATIVE  NEG mg/dL    Ketone NEGATIVE  NEG mg/dL    Blood SMALL (A) NEG      Urobilinogen 1.0 0.2 - 1.0 EU/dL    Nitrites NEGATIVE  NEG      Leukocyte Esterase SMALL (A) NEG      WBC 0-4 0 - 4 /hpf    RBC 5-10 0 - 5 /hpf    Epithelial cells FEW FEW /lpf    Bacteria NEGATIVE  NEG /hpf    UA:UC IF INDICATED CULTURE NOT INDICATED BY UA RESULT CNI      Hyaline cast 2-5 0 - 5 /lpf   BILIRUBIN, CONFIRM    Collection Time: 12/05/17  1:52 AM   Result Value Ref Range    Bilirubin UA, confirm NEGATIVE  NEG         Radiologic Studies -       XR HAND RT MIN 3 V     EXAM:  XR HAND RT MIN 3 V     INDICATION:  Trauma. Status post ground level fall earlier today with right  forearm abrasion and right hand swelling.     COMPARISON: None.     FINDINGS: Three views of the right hand demonstrate no fracture or other acute  osseous or articular abnormality. The soft tissues are within normal limits. Mild osteoarthritic changes are noted.     IMPRESSION  IMPRESSION:  No acute abnormality. CT Results  (Last 48 hours)               12/05/17 0023  CT HEAD WO CONT Final result    Impression:  IMPRESSION: No acute findings. Narrative:  EXAM:  CT HEAD WO CONT       INDICATION:   Ground-level fall earlier this evening with abrasion above left   eye. COMPARISON: None. CONTRAST:  None. TECHNIQUE: Unenhanced CT of the head was performed using 5 mm images. Brain and   bone windows were generated. CT dose reduction was achieved through use of a   standardized protocol tailored for this examination and automatic exposure   control for dose modulation. FINDINGS: The ventricles and sulci are normal in size, shape and configuration   and midline. There is no significant white matter disease. There is no   intracranial hemorrhage, extra-axial collection, mass, mass effect or midline   shift. The basilar cisterns are open. No acute infarct is identified.  The bone   windows demonstrate no abnormalities. The visualized portions of the paranasal   sinuses and mastoid air cells are clear. 12/05/17 0023  CT SPINE CERV WO CONT Final result    Impression:  IMPRESSION: No fracture or other acute injury. Degenerative spine changes as   above. Narrative:  EXAM:  CT CERVICAL SPINE WITHOUT CONTRAST       INDICATION:   Ground-level fall earlier today with abrasion above left eye. COMPARISON: None. CONTRAST:  None. TECHNIQUE: Multislice helical CT of the cervical spine was performed without   intravenous contrast administration. Sagittal and coronal reconstructions were   generated. CT dose reduction was achieved through use of a standardized   protocol tailored for this examination and automatic exposure control for dose   modulation. FINDINGS:       There is intact vertebral body height and alignment. There is loss of vertical   disc height and marginal ossified formation at the C5-6 and C6-7 level. Remainder the discs are relatively well-maintained. There is no fracture or   subluxation. There is uncovertebral hypertrophy and moderate right neural   foraminal narrowing at the C5-6 level with marked facet arthropathy on the right   at the C6-7 level and more mild facet arthropathy at the C5-6 level bilaterally. There is posterior disc osteophyte complex with mild central canal stenosis at   the C6-7 level. No other central canal stenosis is evident. Surrounding soft   tissues and visualized lung apices are unremarkable. CXR Results  (Last 48 hours)               12/05/17 0032  XR CHEST PA LAT Final result    Impression:  IMPRESSION: No acute findings. Narrative:  EXAM:  XR CHEST PA LAT       INDICATION:   Chest Pain       COMPARISON: Chest x-ray 11/27/2017. FINDINGS: PA and lateral radiographs of the chest demonstrate clear lungs.  The   cardiac and mediastinal contours and pulmonary vascularity are normal.  The chest wall structures and visualized upper abdomen show no acute findings with   incidental note of degenerative spine and shoulder changes. Medical Decision Making   I am the first provider for this patient. I reviewed the vital signs, available nursing notes, past medical history, past surgical history, family history and social history. Vital Signs-Reviewed the patient's vital signs. Patient Vitals for the past 12 hrs:   Temp Pulse Resp BP SpO2   12/05/17 0430 - - - 163/75 94 %   12/05/17 0400 - - - 153/67 98 %   12/05/17 0330 - - - 167/81 98 %   12/05/17 0145 - - - (!) 174/99 98 %   12/05/17 0100 - - - 167/89 99 %   12/05/17 0032 - - - - 98 %   12/05/17 0030 - - - 143/78 -   12/04/17 2307 98 °F (36.7 °C) 70 16 126/70 97 %       Pulse Oximetry Analysis - 97% on RA    Cardiac Monitor:   Rate: 75bpm  Rhythm: Normal Sinus Rhythm       Records Reviewed: Nursing Notes, Old Medical Records, Previous electrocardiograms, Ambulance Run Sheet, Previous Radiology Studies and Previous Laboratory Studies    Provider Notes (Medical Decision Making):     DDx: sprain, strain, fracture, contusion, laceration, ICH, GLF, dehydration, electrolyte abnormality, UTI, PNA    ED Course:   Initial assessment performed. The patients presenting problems have been discussed, and they are in agreement with the care plan formulated and outlined with them. I have encouraged them to ask questions as they arise throughout their visit. EKG interpretation: (Preliminary) 2322  Rhythm: normal sinus rhythm. Rate (approx.): 69bpm; Axis: left axis deviation; Normal MS, QRS, QTc intervals; ST/T wave: non-specific changes; Other findings: possible ischemia.   Written by Yuni King ED Scribe, as dictated by Jacob Christie MD    Procedure Note - Laceration Repair:  4:18 AM  Procedure by Lucy Brantley MD.  Complexity: simple  2.5cm linear laceration to L eyebrow  was irrigated copiously with NS under jet lavage, prepped with Shur Clens and draped in a sterile fashion. The area was anesthetized with 2 mLs of  Lidocaine 1% without epinephrine via local infiltration. The wound was explored with the following results: No foreign bodies found. The wound was repaired with One layer suture closure: Skin Layer:  5 sutures placed, stitch type:simple interrupted, suture: 5-0 plain gut. .  The wound was closed with good hemostasis and approximation. Sterile dressing applied. Estimated blood loss: minimal  The procedure took 16-30 minutes, and pt tolerated well. Progress note:  4:48 AM  Pt noted to be feeling better, ready for discharge. Updated pt and/or family on all final lab and imaging findings. Will follow up as instructed. All questions have been answered, pt voiced understanding and agreement with plan. Specific return precautions provided as well as instructions to return to the ED should sx worsen at any time. Vital signs stable for discharge. Written by Bhavana Gallegos, ED Scribe, as dictated by Ashtyn Jurado MD    PLAN:  1. Current Discharge Medication List        2. Follow-up Information     Follow up With Details Comments Contact Info    Michal Councilman, MD In 2 days  [id number]      [facility] EMERGENCY DEPT  If symptoms worsen [address]  [phone]        Return to ED if worse     Disposition:    DISCHARGE NOTE:  4:48 AM  The patient's results have been reviewed with family and/or caregiver. They verbally convey their understanding and agreement of the patient's signs, symptoms, diagnosis, treatment, and prognosis. They additionally agree to follow up as recommended in the discharge instructions or to return to the Emergency Room should the patient's condition change prior to their follow-up appointment. The family and/or caregiver verbally agrees with the care-plan and all of their questions have been answered.  The discharge instructions have also been provided to the them along with educational information regarding the patient's diagnosis and a list of reasons why the patient would want to return to the ER prior to their follow-up appointment should their condition change. Written by JUDITH Peralta, as dictated by Tiffany Maldonado MD.       Diagnosis     Clinical Impression:   1. Fall, initial encounter    2. Eyebrow laceration, left, initial encounter    3. Contusion of right hand, initial encounter        Attestations: This note is prepared by Yamilet Glass, acting as Scribe for April Merlinda Motes, MD April Merlinda Motes, MD : The scribe's documentation has been prepared under my direction and personally reviewed by me in its entirety. I confirm that the note above accurately reflects all work, treatment, procedures, and medical decision making performed by me. This note will not be viewable in [address]

## 2022-06-14 NOTE — ED NOTES
[facility] called for transport back to [facility], ETA 30min
Assumed care, pt resting in bed, call bell within reach, pt is pleasantly confused and ED staff remains at bedside for safety, awaiting dispo
Left brow laceration cleaned and sutured by dr Queta Whitehead, pt tolerated well, neosporin and dry dressing applied to lac
Pt d/c back to NH via [facility] transport
Pt found getting out of bed during rounding, pt now has tech sitting to ensure safety
Pt provided with pillow and warm blanket for comfort, encouraged pt to close eyes and try to rest
Pt sleeping
Pt straight cathed for urine, tolerated well
TRANSFER - OUT REPORT:    Verbal report given to hortencia marte LPN (name) on [facility] Currie  [facility]        Report consisted of patients Situation, Background, Assessment and   Recommendations(SBAR). Information from the following report(s) ED Summary was reviewed with the receiving nurse. Lines:   Peripheral IV 12/05/17 Left Hand (Active)   Site Assessment Clean, dry, & intact 12/5/2017 12:40 AM   Phlebitis Assessment 0 12/5/2017 12:40 AM   Infiltration Assessment 0 12/5/2017 12:40 AM   Dressing Status Clean, dry, & intact 12/5/2017 12:40 AM   Hub Color/Line Status Pink 12/5/2017 12:40 AM        Opportunity for questions and clarification was provided.       Patient transported with [facility]
[employer]

## (undated) DEVICE — SYR ASSEMB INFL BLLN 60ML --

## (undated) DEVICE — FORCEPS BX L160CM DIA8MM GRSP DISECT CUP TIP NONLOCKING ROT

## (undated) DEVICE — SYR 3ML LL TIP 1/10ML GRAD --

## (undated) DEVICE — BASIN EMESIS 500CC ROSE 250/CS 60/PLT: Brand: MEDEGEN MEDICAL PRODUCTS, LLC

## (undated) DEVICE — SOLIDIFIER MEDC 1200ML -- CONVERT TO 356117

## (undated) DEVICE — TOWEL 4 PLY TISS 19X30 SUE WHT

## (undated) DEVICE — 1200 GUARD II KIT W/5MM TUBE W/O VAC TUBE: Brand: GUARDIAN

## (undated) DEVICE — ESOPHAGEAL BALLOON DILATATION CATHETER: Brand: CRE FIXED WIRE

## (undated) DEVICE — Device

## (undated) DEVICE — MEDI-VAC YANK SUCT HNDL W/TPRD BULBOUS TIP: Brand: CARDINAL HEALTH

## (undated) DEVICE — Device: Brand: MEDEX

## (undated) DEVICE — CATH IV AUTOGRD BC PNK 20GA 25 -- INSYTE

## (undated) DEVICE — NEEDLE HYPO 18GA L1.5IN PNK S STL HUB POLYPR SHLD REG BVL

## (undated) DEVICE — Z DISCONTINUED PER MEDLINE LINE GAS SAMPLING O2/CO2 LNG AD 13 FT NSL W/ TBNG FILTERLINE

## (undated) DEVICE — SET ADMIN 16ML TBNG L100IN 2 Y INJ SITE IV PIGGY BK DISP

## (undated) DEVICE — CONTAINER SPEC 20 ML LID NEUT BUFF FORMALIN 10 % POLYPR STS

## (undated) DEVICE — NEONATAL-ADULT SPO2 SENSOR: Brand: NELLCOR

## (undated) DEVICE — KENDALL RADIOLUCENT FOAM MONITORING ELECTRODE RECTANGULAR SHAPE: Brand: KENDALL

## (undated) DEVICE — BLOCK BITE ENDOSCP AD 21 MM W/ DIL BLU LF DISP

## (undated) DEVICE — BAG SPEC BIOHZRD 10 X 10 IN --

## (undated) DEVICE — (D)SYR 10ML 1/5ML GRAD NSAF -- PKGING CHANGE USE ITEM 338027